# Patient Record
Sex: MALE | Race: WHITE | NOT HISPANIC OR LATINO | URBAN - METROPOLITAN AREA
[De-identification: names, ages, dates, MRNs, and addresses within clinical notes are randomized per-mention and may not be internally consistent; named-entity substitution may affect disease eponyms.]

---

## 2017-04-21 ENCOUNTER — OFFICE VISIT - HEALTHEAST (OUTPATIENT)
Dept: FAMILY MEDICINE | Facility: CLINIC | Age: 57
End: 2017-04-21

## 2017-04-21 DIAGNOSIS — Z00.00 ANNUAL PHYSICAL EXAM: ICD-10-CM

## 2017-04-21 DIAGNOSIS — B18.2 CHRONIC HEPATITIS C VIRUS INFECTION (H): ICD-10-CM

## 2017-04-21 DIAGNOSIS — Z00.00 HEALTHCARE MAINTENANCE: ICD-10-CM

## 2017-04-21 LAB
CHOLEST SERPL-MCNC: 129 MG/DL
FASTING STATUS PATIENT QL REPORTED: YES
HDLC SERPL-MCNC: 51 MG/DL
LDLC SERPL CALC-MCNC: 63 MG/DL
TRIGL SERPL-MCNC: 74 MG/DL

## 2017-04-21 ASSESSMENT — MIFFLIN-ST. JEOR: SCORE: 1672.5

## 2017-04-22 ENCOUNTER — COMMUNICATION - HEALTHEAST (OUTPATIENT)
Dept: FAMILY MEDICINE | Facility: CLINIC | Age: 57
End: 2017-04-22

## 2017-04-24 ENCOUNTER — AMBULATORY - HEALTHEAST (OUTPATIENT)
Dept: FAMILY MEDICINE | Facility: CLINIC | Age: 57
End: 2017-04-24

## 2017-04-24 DIAGNOSIS — B18.2 CHRONIC HEPATITIS C VIRUS INFECTION (H): ICD-10-CM

## 2017-04-27 ENCOUNTER — AMBULATORY - HEALTHEAST (OUTPATIENT)
Dept: LAB | Facility: CLINIC | Age: 57
End: 2017-04-27

## 2017-04-27 DIAGNOSIS — B18.2 CHRONIC HEPATITIS C VIRUS INFECTION (H): ICD-10-CM

## 2017-04-29 LAB — HCV RNA DETECT/QUANT, S - HISTORICAL: ABNORMAL IU/ML

## 2017-05-01 LAB — HCV GENOTYPE, S - HISTORICAL: ABNORMAL

## 2017-05-02 ENCOUNTER — COMMUNICATION - HEALTHEAST (OUTPATIENT)
Dept: FAMILY MEDICINE | Facility: CLINIC | Age: 57
End: 2017-05-02

## 2017-12-18 ENCOUNTER — OFFICE VISIT - HEALTHEAST (OUTPATIENT)
Dept: FAMILY MEDICINE | Facility: CLINIC | Age: 57
End: 2017-12-18

## 2017-12-18 DIAGNOSIS — B18.2 CHRONIC HEPATITIS C VIRUS INFECTION (H): ICD-10-CM

## 2017-12-18 DIAGNOSIS — Z63.0 MARITAL RELATIONSHIP PROBLEM: ICD-10-CM

## 2017-12-18 DIAGNOSIS — Z12.11 SCREEN FOR COLON CANCER: ICD-10-CM

## 2017-12-18 SDOH — SOCIAL STABILITY - SOCIAL INSECURITY: PROBLEMS IN RELATIONSHIP WITH SPOUSE OR PARTNER: Z63.0

## 2017-12-19 LAB — HBV SURFACE AB SERPL IA-ACNC: NEGATIVE M[IU]/ML

## 2017-12-20 ENCOUNTER — COMMUNICATION - HEALTHEAST (OUTPATIENT)
Dept: FAMILY MEDICINE | Facility: CLINIC | Age: 57
End: 2017-12-20

## 2017-12-20 ENCOUNTER — AMBULATORY - HEALTHEAST (OUTPATIENT)
Dept: FAMILY MEDICINE | Facility: CLINIC | Age: 57
End: 2017-12-20

## 2017-12-20 DIAGNOSIS — Z00.00 HEALTH CARE MAINTENANCE: ICD-10-CM

## 2017-12-20 LAB
HAV IGM SERPL QL IA: NEGATIVE
HBV CORE IGM SERPL QL IA: NEGATIVE
HBV SURFACE AG SERPL QL IA: NEGATIVE
HCV AB SERPL QL IA: POSITIVE

## 2017-12-21 ENCOUNTER — COMMUNICATION - HEALTHEAST (OUTPATIENT)
Dept: FAMILY MEDICINE | Facility: CLINIC | Age: 57
End: 2017-12-21

## 2021-05-30 VITALS — WEIGHT: 186.6 LBS | HEIGHT: 71 IN | BODY MASS INDEX: 26.12 KG/M2

## 2021-05-31 VITALS — BODY MASS INDEX: 25.59 KG/M2 | WEIGHT: 184.8 LBS
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
## 2024-02-27 ENCOUNTER — TRANSFERRED RECORDS (OUTPATIENT)
Dept: HEALTH INFORMATION MANAGEMENT | Facility: CLINIC | Age: 64
End: 2024-02-27

## 2025-01-21 ENCOUNTER — TRANSFERRED RECORDS (OUTPATIENT)
Dept: HEALTH INFORMATION MANAGEMENT | Facility: CLINIC | Age: 65
End: 2025-01-21

## 2025-02-07 ENCOUNTER — TRANSFERRED RECORDS (OUTPATIENT)
Dept: HEALTH INFORMATION MANAGEMENT | Facility: CLINIC | Age: 65
End: 2025-02-07